# Patient Record
Sex: FEMALE | Race: OTHER | NOT HISPANIC OR LATINO
[De-identification: names, ages, dates, MRNs, and addresses within clinical notes are randomized per-mention and may not be internally consistent; named-entity substitution may affect disease eponyms.]

---

## 2022-11-29 ENCOUNTER — APPOINTMENT (OUTPATIENT)
Dept: ORTHOPEDIC SURGERY | Facility: CLINIC | Age: 52
End: 2022-11-29

## 2022-11-29 VITALS — WEIGHT: 200 LBS | HEIGHT: 66 IN | BODY MASS INDEX: 32.14 KG/M2

## 2022-11-29 DIAGNOSIS — Z78.9 OTHER SPECIFIED HEALTH STATUS: ICD-10-CM

## 2022-11-29 DIAGNOSIS — J45.909 UNSPECIFIED ASTHMA, UNCOMPLICATED: ICD-10-CM

## 2022-11-29 PROBLEM — Z00.00 ENCOUNTER FOR PREVENTIVE HEALTH EXAMINATION: Status: ACTIVE | Noted: 2022-11-29

## 2022-11-29 PROCEDURE — 73030 X-RAY EXAM OF SHOULDER: CPT | Mod: LT

## 2022-11-29 PROCEDURE — 99204 OFFICE O/P NEW MOD 45 MIN: CPT

## 2022-11-29 NOTE — DISCUSSION/SUMMARY
[de-identified] : Follow up after MRI of left shoulder to eval rotator cuff tear and proximal humerus fracture \par \par Exercise packet provided. \par \par Hydrocodone prescribed for pain \par -----------------------------------------------\par Home Exercise\par The patient is instructed on a home exercise program.\par \par LISSET MUNROE Acting as a Scribe for Dr. Johnston\par I, Lisset Munroe, attest that this documentation has been prepared under the direction and in the presence of Provider José Miguel Johnston MD.\par \par Activity Modification\par The patient was advised to modify their activities.\par \par Dx / Natural History\par The patient was advised of the diagnosis.  The natural history of the pathology was explained in full to the patient in layman's terms.  Several different treatment options were discussed and explained in full to the patient including the risks and benefits of both surgical and non-surgical treatments.  All questions and concerns were answered.\par \par Pain Guide Activities\par The patient was advised to let pain guide the gradual advancement of activities.\par \par RICE\par I explained to the patient that rest, ice, compression, and elevation would benefit them.  They may return to activity after follow-up or when they no longer have any pain.

## 2022-11-29 NOTE — PHYSICAL EXAM
[de-identified] : Neurologic: normal coordination, normal DTR UE/LE , normal sensation\par Skin: normal skin, no rash, no lesions\par Constitutional: well developed and well nourished.\par Cardiovascular: peripheral vascular exam is grossly normal.\par Abdomen: normal bowel sounds, non-tender, no HSM and no mass. \par \par Left Shoulder: +Impingement test\par +Neer test\par +Calderon test\par +Romi sign\par 4-/5 Supraspinatus Strength\par 3-view x-ray: Proximal humerus fracture

## 2022-11-29 NOTE — HISTORY OF PRESENT ILLNESS
[de-identified] : The patient is a 52 year old [right] hand dominant female who presents today complaining of left shoulder pain.  \par Date of Injury/Onset:   11/26/22\par Pain:    At Rest:   10/10 \par With Activity:   10/10 \par Mechanism of injury:  took a fall down 3 steps and left shoulder hit the wall\par This is [NOT]  a Work Related Injury being treated under Worker's Compensation.\par This is [NOT] an athletic injury occurring associated with an interscholastic or organized sports team.\par Quality of symptoms:  sharp stabbing constant\par Improves with:   IBU, Oxycodone\par Worse with:   certain movements\par Prior treatment: [facility]\par Prior Imaging:  xray done but no disc\par Out of work/sport: [ YES/NO ] , since [ DATE OF INJURY ] \par School/Sport/Position/Occupation:_\par Additional Information: None\par \par \par \par

## 2022-12-05 ENCOUNTER — FORM ENCOUNTER (OUTPATIENT)
Age: 52
End: 2022-12-05

## 2022-12-06 ENCOUNTER — APPOINTMENT (OUTPATIENT)
Dept: MRI IMAGING | Facility: CLINIC | Age: 52
End: 2022-12-06

## 2022-12-06 PROCEDURE — 73221 MRI JOINT UPR EXTREM W/O DYE: CPT | Mod: LT

## 2022-12-06 RX ORDER — HYDROCODONE BITARTRATE AND ACETAMINOPHEN 5; 325 MG/1; MG/1
5-325 TABLET ORAL
Qty: 30 | Refills: 0 | Status: ACTIVE | COMMUNITY
Start: 2022-12-06 | End: 1900-01-01

## 2022-12-19 ENCOUNTER — APPOINTMENT (OUTPATIENT)
Dept: ORTHOPEDIC SURGERY | Facility: CLINIC | Age: 52
End: 2022-12-19

## 2022-12-19 VITALS — WEIGHT: 200 LBS | BODY MASS INDEX: 32.14 KG/M2 | HEIGHT: 66 IN

## 2022-12-19 PROCEDURE — 99214 OFFICE O/P EST MOD 30 MIN: CPT

## 2022-12-19 NOTE — DISCUSSION/SUMMARY
[de-identified] : MRI reviewed and discussed. Patient will initiate PT for passive motion and fu in 3 weeks.\par \par -----------------------------------------------\par Home Exercise\par The patient is instructed on a home exercise program.\par \par Activity Modification\par The patient was advised to modify their activities.\par \par Dx / Natural History\par The patient was advised of the diagnosis. The natural history of the pathology was explained in full to the patient in layman's terms. Several different treatment options were discussed and explained in full to the patient including the risks and benefits of both surgical and non-surgical treatments. All questions and concerns were answered.\par \par Pain Guide Activities\par The patient was advised to let pain guide the gradual advancement of activities.\par \par RICE \par I explained to the patient that rest, ice, compression, and elevation would benefit them.  They may return to activity after follow-up or when they no longer have any pain.\par \par \par Gilbert Quinonez PA-C Acting as a Scribe for Dr. Johnston.\par

## 2022-12-19 NOTE — DATA REVIEWED
[FreeTextEntry1] : [facility] 12/6/22 Left shoulder MRI: 1. Acute appearing comminuted fracture involving the greater tuberosity, humeral neck and metaphysis \par extending into the humeral head without gross malalignment with liquefaction necrosis in the proximal humeral \par metaphysis and diffuse surrounding soft tissue swelling and muscle strains.\par 2. Large glenohumeral joint effusion, glenohumeral joint capsular laxity anteroinferiorly and anterior inferior \par labral tearing raising the possibility of recent anterior shoulder dislocation episode.\par 3. Rotator cuff tendinopathy without discontinuity, biceps tenosynovitis, mild AC joint arthrosis and mild \par lateral acromial bone spurs.\par 4. No marrow edema in the glenoid or evidence of loose body.\par 5. Clinical correlation is recommended. Consider CT scan of the left shoulder as clinically indicated.

## 2022-12-19 NOTE — PHYSICAL EXAM
[de-identified] : Neurologic: normal coordination, normal DTR UE/LE , normal sensation\par Skin: normal skin, no rash, no lesions\par Constitutional: well developed and well nourished.\par Cardiovascular: peripheral vascular exam is grossly normal.\par Abdomen: normal bowel sounds, non-tender, no HSM and no mass. \par \par Left Shoulder: +Impingement test\par +Neer test\par +Calderon test\par +Romi sign\par 4-/5 Supraspinatus Strength\par 3-view x-ray: Proximal humerus fracture

## 2022-12-19 NOTE — HISTORY OF PRESENT ILLNESS
[de-identified] : The patient is a 52 year old [right] hand dominant female who presents today complaining of left shoulder pain. \par Date of Injury/Onset: 11/26/22\par Pain: At Rest: 10/10 \par With Activity: 10/10 \par Mechanism of injury: took a fall down 3 steps and left shoulder hit the wall\par This is [NOT] a Work Related Injury being treated under Worker's Compensation.\par This is [NOT] an athletic injury occurring associated with an interscholastic or organized sports team.\par Quality of symptoms: sharp stabbing constant\par Improves with: IBU, Oxycodone\par Worse with: certain movements\par Prior treatment: [facility]\par Prior Imaging: MRI ([facility])\par Out of work/sport: [ YES/NO ] , since [ DATE OF INJURY ] \par School/Sport/Position/Occupation:_\par Additional Information: None\par

## 2023-01-09 ENCOUNTER — APPOINTMENT (OUTPATIENT)
Dept: ORTHOPEDIC SURGERY | Facility: CLINIC | Age: 53
End: 2023-01-09
Payer: MEDICAID

## 2023-01-09 DIAGNOSIS — S42.209A UNSPECIFIED FRACTURE OF UPPER END OF UNSPECIFIED HUMERUS, INITIAL ENCOUNTER FOR CLOSED FRACTURE: ICD-10-CM

## 2023-01-09 PROCEDURE — 73030 X-RAY EXAM OF SHOULDER: CPT | Mod: LT

## 2023-01-09 PROCEDURE — 99214 OFFICE O/P EST MOD 30 MIN: CPT

## 2023-01-09 NOTE — PHYSICAL EXAM
[Left] : left shoulder [Right] : right shoulder [de-identified] : Neurologic: normal coordination, normal DTR UE/LE , normal sensation, normal mood and affect, orientated and able to communicate.\par \par Skin: normal skin, no rash, no ulcers and no lesions.\par \par Constitutional: well developed and well nourished.\par \par  [] : no tenderness to palpation [FreeTextEntry1] : callus formation left humerus Fx. [TWNoteComboBox4] : passive forward flexion 130 degrees [TWNoteComboBox9] : passive abduction 100 degrees

## 2023-01-09 NOTE — DISCUSSION/SUMMARY
[de-identified] : Continue PT, no restriction. Follow up 6 weeks.\par \par ----------------------------------------------------------------------------\par Home Exercise \par \par  The patient is instructed on a home exercise program. \par  \par RICE \par I explained to the patient that rest, ice, compression, and elevation would benefit them.  They may return to activity after follow-up or when they no longer have any pain. \par  \par Pain Guide Activities \par The patient was advised to let pain guide the gradual advancement of activities. \par  \par Activity Modification \par The patient was advised to modify their activities. \par  \par Dx / Natural History \par The patient was advised of the diagnosis.  The natural history of the pathology was explained in full to the patient in layman's terms.  Several different treatment options were discussed and explained in full to the patient including the risks and benefits of both surgical and non-surgical treatments.  All questions and concerns were answered.\par \par "Written by Venu Perkins, acting as Scribe for José Miguel Johnston M.D"\par 
Yes

## 2023-01-09 NOTE — HISTORY OF PRESENT ILLNESS
[de-identified] : The patient is a 52 year old [right] hand dominant female who presents today complaining of left shoulder pain. \par Date of Injury/Onset: 11/26/22\par Pain: At Rest: 5-6/10 \par With Activity:  7/10 \par Mechanism of injury: took a fall down 3 steps and left shoulder hit the wall\par This is [NOT] a Work Related Injury being treated under Worker's Compensation.\par This is [NOT] an athletic injury occurring associated with an interscholastic or organized sports team.\par Quality of symptoms: sharp stabbing constant\par Improves with: IBU, Oxycodone\par Worse with: certain movements\par Prior treatment: [facility]\par Prior Imaging: MRI ([facility])\par Out of work/sport: [ YES/NO ] , since [ DATE OF INJURY ] \par School/Sport/Position/Occupation:_\par Additional Information: None

## 2023-02-27 ENCOUNTER — APPOINTMENT (OUTPATIENT)
Dept: ORTHOPEDIC SURGERY | Facility: CLINIC | Age: 53
End: 2023-02-27
Payer: MEDICAID

## 2023-02-27 VITALS — WEIGHT: 200 LBS | BODY MASS INDEX: 32.14 KG/M2 | HEIGHT: 66 IN

## 2023-02-27 PROCEDURE — 99214 OFFICE O/P EST MOD 30 MIN: CPT

## 2023-03-04 NOTE — PHYSICAL EXAM
[Right] : right shoulder [Left] : left shoulder [de-identified] : Neurologic: normal coordination, normal DTR UE/LE , normal sensation, normal mood and affect, orientated and able to communicate.\par \par Skin: normal skin, no rash, no ulcers and no lesions.\par \par Constitutional: well developed and well nourished.\par \par  [] : no tenderness to palpation [FreeTextEntry9] : Stabilized abduction to  40.  [de-identified] : 5-/5 supraspinatus  [TWNoteComboBox4] : passive forward flexion 155 degrees [FreeTextEntry1] : callus formation left humerus Fx. [TWNoteComboBox9] : passive abduction 100 degrees

## 2023-03-04 NOTE — DISCUSSION/SUMMARY
[de-identified] : PT Rx renewed. Patient will follow up in 6 weeks.\par Patient was shown how to properly complete wall walks at home.\par ----------------------------------------------------------------------------\par Home Exercise \par \par  The patient is instructed on a home exercise program. \par  \par RICE \par I explained to the patient that rest, ice, compression, and elevation would benefit them.  They may return to activity after follow-up or when they no longer have any pain. \par  \par Pain Guide Activities \par The patient was advised to let pain guide the gradual advancement of activities. \par  \par Activity Modification \par The patient was advised to modify their activities. \par  \par Dx / Natural History \par The patient was advised of the diagnosis.  The natural history of the pathology was explained in full to the patient in layman's terms.  Several different treatment options were discussed and explained in full to the patient including the risks and benefits of both surgical and non-surgical treatments.  All questions and concerns were answered.\par \par "Written by Venu Perkins, acting as Scribe for José Miguel Johnston M.D"\par

## 2023-03-04 NOTE — HISTORY OF PRESENT ILLNESS
[de-identified] : The patient is a 53 year old [right] hand dominant female who presents today complaining of left shoulder pain. Patient has been attending PT at [facility]. Patient says that she completes wall walks as well. \par Date of Injury/Onset: 11/26/22\par Pain: At Rest: 4/10 \par With Activity:  7/10 \par Mechanism of injury: took a fall down 3 steps and left shoulder hit the wall\par This is [NOT] a Work Related Injury being treated under Worker's Compensation.\par This is [NOT] an athletic injury occurring associated with an interscholastic or organized sports team.\par Quality of symptoms:\par Worse with : Intermittently with waking in morning\par Improves with: PT \par Prior treatment: PT - [facility]\par Prior Imaging: MRI ([facility])\par Out of work/sport: [ YES/NO ] , since [ DATE OF INJURY ] \par School/Sport/Position/Occupation:_\par Additional Information: None

## 2023-04-10 ENCOUNTER — APPOINTMENT (OUTPATIENT)
Dept: ORTHOPEDIC SURGERY | Facility: CLINIC | Age: 53
End: 2023-04-10
Payer: MEDICAID

## 2023-04-10 VITALS — HEIGHT: 66 IN | BODY MASS INDEX: 32.14 KG/M2 | WEIGHT: 200 LBS

## 2023-04-10 DIAGNOSIS — M25.512 PAIN IN LEFT SHOULDER: ICD-10-CM

## 2023-04-10 DIAGNOSIS — M79.18 MYALGIA, OTHER SITE: ICD-10-CM

## 2023-04-10 DIAGNOSIS — S49.92XA UNSPECIFIED INJURY OF LEFT SHOULDER AND UPPER ARM, INITIAL ENCOUNTER: ICD-10-CM

## 2023-04-10 PROCEDURE — 99214 OFFICE O/P EST MOD 30 MIN: CPT

## 2023-04-10 NOTE — PHYSICAL EXAM
[Right] : right shoulder [Left] : left shoulder [de-identified] : Neurologic: normal coordination, normal DTR UE/LE , normal sensation, normal mood and affect, orientated and able to communicate.\par \par Skin: normal skin, no rash, no ulcers and no lesions.\par \par Constitutional: well developed and well nourished.\par \par  [] : no tenderness to palpation [FreeTextEntry9] : Stabilized abduction to  60 [de-identified] : 5-/5 supraspinatus  [FreeTextEntry1] : callus formation left humerus Fx. [TWNoteComboBox4] : passive forward flexion 160 degrees [TWNoteComboBox9] : passive abduction 120 degrees

## 2023-04-10 NOTE — HISTORY OF PRESENT ILLNESS
[de-identified] : The patient is a 53 year old [right] hand dominant female who presents today complaining of left shoulder pain. Patient has been attending PT at [facility]. Patient says that she completes wall walks as well. \par Date of Injury/Onset: 11/26/22\par Pain: At Rest: 0/10 \par With Activity:  2/10 \par Mechanism of injury: took a fall down 3 steps and left shoulder hit the wall\par This is [NOT] a Work Related Injury being treated under Worker's Compensation.\par This is [NOT] an athletic injury occurring associated with an interscholastic or organized sports team.\par Quality of symptoms:\par Worse with : Intermittently with waking in morning\par Improves with: PT \par Prior treatment: PT - [facility]\par Prior Imaging: MRI ([facility])\par Out of work/sport: [ YES/NO ] , since [ DATE OF INJURY ] \par School/Sport/Position/Occupation:_\par Additional Information: None

## 2024-07-06 ENCOUNTER — NON-APPOINTMENT (OUTPATIENT)
Age: 54
End: 2024-07-06